# Patient Record
Sex: FEMALE | Race: WHITE | NOT HISPANIC OR LATINO | Employment: UNEMPLOYED | URBAN - NONMETROPOLITAN AREA
[De-identification: names, ages, dates, MRNs, and addresses within clinical notes are randomized per-mention and may not be internally consistent; named-entity substitution may affect disease eponyms.]

---

## 2021-01-11 ENCOUNTER — IMMUNIZATION (OUTPATIENT)
Dept: VACCINE CLINIC | Facility: HOSPITAL | Age: 81
End: 2021-01-11

## 2021-01-11 PROCEDURE — 91300 HC SARSCOV02 VAC 30MCG/0.3ML IM: CPT | Performed by: FAMILY MEDICINE

## 2021-01-11 PROCEDURE — 0001A: CPT | Performed by: FAMILY MEDICINE

## 2021-02-01 ENCOUNTER — IMMUNIZATION (OUTPATIENT)
Dept: VACCINE CLINIC | Facility: HOSPITAL | Age: 81
End: 2021-02-01

## 2021-02-01 PROCEDURE — 91300 HC SARSCOV02 VAC 30MCG/0.3ML IM: CPT | Performed by: FAMILY MEDICINE

## 2021-02-01 PROCEDURE — 0002A: CPT | Performed by: FAMILY MEDICINE

## 2023-03-09 DIAGNOSIS — M25.562 LEFT KNEE PAIN, UNSPECIFIED CHRONICITY: Primary | ICD-10-CM

## 2023-03-10 ENCOUNTER — OFFICE VISIT (OUTPATIENT)
Dept: ORTHOPEDIC SURGERY | Facility: CLINIC | Age: 83
End: 2023-03-10
Payer: MEDICARE

## 2023-03-10 ENCOUNTER — HOSPITAL ENCOUNTER (OUTPATIENT)
Dept: GENERAL RADIOLOGY | Facility: HOSPITAL | Age: 83
Discharge: HOME | End: 2023-03-10
Admitting: FAMILY MEDICINE
Payer: MEDICARE

## 2023-03-10 VITALS
HEART RATE: 71 BPM | HEIGHT: 68 IN | RESPIRATION RATE: 18 BRPM | SYSTOLIC BLOOD PRESSURE: 153 MMHG | TEMPERATURE: 97.3 F | WEIGHT: 180 LBS | DIASTOLIC BLOOD PRESSURE: 71 MMHG | OXYGEN SATURATION: 99 % | BODY MASS INDEX: 27.28 KG/M2

## 2023-03-10 DIAGNOSIS — K21.9 GASTROESOPHAGEAL REFLUX DISEASE, UNSPECIFIED WHETHER ESOPHAGITIS PRESENT: ICD-10-CM

## 2023-03-10 DIAGNOSIS — M25.562 CHRONIC PAIN OF LEFT KNEE: Primary | ICD-10-CM

## 2023-03-10 DIAGNOSIS — M25.562 LEFT KNEE PAIN, UNSPECIFIED CHRONICITY: ICD-10-CM

## 2023-03-10 DIAGNOSIS — G89.29 CHRONIC PAIN OF LEFT KNEE: Primary | ICD-10-CM

## 2023-03-10 DIAGNOSIS — M17.12 PRIMARY OSTEOARTHRITIS OF LEFT KNEE: ICD-10-CM

## 2023-03-10 PROCEDURE — 1160F RVW MEDS BY RX/DR IN RCRD: CPT | Performed by: FAMILY MEDICINE

## 2023-03-10 PROCEDURE — 99204 OFFICE O/P NEW MOD 45 MIN: CPT | Performed by: FAMILY MEDICINE

## 2023-03-10 PROCEDURE — 73562 X-RAY EXAM OF KNEE 3: CPT

## 2023-03-10 PROCEDURE — 73562 X-RAY EXAM OF KNEE 3: CPT | Performed by: RADIOLOGY

## 2023-03-10 PROCEDURE — 1159F MED LIST DOCD IN RCRD: CPT | Performed by: FAMILY MEDICINE

## 2023-03-10 RX ORDER — NITROGLYCERIN 0.4 MG/1
0.4 TABLET SUBLINGUAL AS NEEDED
COMMUNITY

## 2023-03-10 RX ORDER — PANTOPRAZOLE SODIUM 40 MG/1
40 TABLET, DELAYED RELEASE ORAL DAILY
COMMUNITY
Start: 2023-01-05

## 2023-03-10 RX ORDER — MECLIZINE HCL 25MG 25 MG/1
25 TABLET, CHEWABLE ORAL AS NEEDED
COMMUNITY

## 2023-03-10 RX ORDER — ASPIRIN 81 MG/1
81 TABLET ORAL AS NEEDED
COMMUNITY

## 2023-03-10 RX ORDER — LEVOTHYROXINE SODIUM 0.05 MG/1
50 TABLET ORAL
COMMUNITY
Start: 2023-01-05

## 2023-03-10 RX ORDER — LUBIPROSTONE 24 UG/1
24 CAPSULE ORAL
COMMUNITY
Start: 2023-01-05

## 2023-03-10 NOTE — PROGRESS NOTES
New Patient Visit      Patient: Tiffani Jean Baptiste  YOB: 1940  Date of Encounter: 03/10/2023  PCP: Hamida Veras MD  Referring Provider: Self Referring     Subjective   Tiffani Jean Baptiste is a 82 y.o. female who presents to the office today for evaluation of Initial Evaluation and Pain of the Left Knee      Chief Complaint   Patient presents with   • Left Knee - Initial Evaluation, Pain       HPI   New patient presents complaining of left knee pain. She has been having intermittent left knee flare-ups for about 1 year. They have been getting worse for the last 3 months. She states that at baseline she does not have much pain, but will intermittently have a severe flare up making it difficult to walk for several days until she can get it calmed down using ibuprofen, ice, and rest. She denies any injury. The patient does have a history of arthritis in the contralateral knee, and she has had a partial knee replacement on the right side. She states that the pain is currently 3/10, but at its worst it will be 10/10. She is administering large amounts of ibuprofen 600 to 1000 mg twice daily during flare ups, which does not provide great relief.    There is no problem list on file for this patient.      Past Medical History:   Diagnosis Date   • Osteoarthritis        Past Surgical History:   Procedure Laterality Date   • HYSTERECTOMY     • ILEOSTOMY     • KNEE ARTHROPLASTY, PARTIAL REPLACEMENT Right    • RECTOCELE REPAIR     • TONSILLECTOMY         History reviewed. No pertinent family history.    Social History     Socioeconomic History   • Marital status: Single   Tobacco Use   • Smoking status: Never   • Smokeless tobacco: Never   Vaping Use   • Vaping Use: Never used   Substance and Sexual Activity   • Alcohol use: Yes   • Drug use: Yes   • Sexual activity: Defer       Current Outpatient Medications   Medication Sig Dispense Refill   • aspirin 81 MG EC tablet Take 1 tablet by mouth As Needed.     •  "Cholecalciferol 25 MCG (1000 UT) capsule Take 1 capsule by mouth Daily.     • levothyroxine (SYNTHROID, LEVOTHROID) 50 MCG tablet Take 1 tablet by mouth.     • lubiprostone (AMITIZA) 24 MCG capsule Take 1 capsule by mouth.     • meclizine 25 MG chewable tablet chewable tablet Chew 1 tablet As Needed.     • nitroglycerin (NITROSTAT) 0.4 MG SL tablet Place 1 tablet under the tongue As Needed.     • pantoprazole (PROTONIX) 40 MG EC tablet Take 1 tablet by mouth Daily.     • diclofenac (VOLTAREN) 50 MG EC tablet Take 1 tablet by mouth 2 (Two) Times a Day As Needed (knee pain, take with food). 60 tablet 1     No current facility-administered medications for this visit.       Allergies   Allergen Reactions   • Atorvastatin Headache       Review of Systems   Constitutional: Positive for activity change. Negative for fever.   Respiratory: Negative for shortness of breath and wheezing.    Cardiovascular: Negative for chest pain.   Musculoskeletal: Positive for arthralgias, gait problem, joint swelling and myalgias.   Skin: Negative for color change and wound.   Neurological: Negative for weakness and numbness.       Visit Vitals  /71 (BP Location: Right arm, Patient Position: Sitting, Cuff Size: Adult)   Pulse 71   Temp 97.3 °F (36.3 °C) (Temporal)   Resp 18   Ht 172.7 cm (68\")   Wt 81.6 kg (180 lb)   SpO2 99%   BMI 27.37 kg/m²     82 y.o.female  Physical Exam  Vitals and nursing note reviewed.   Constitutional:       General: She is not in acute distress.     Appearance: Normal appearance.   Pulmonary:      Effort: Pulmonary effort is normal. No respiratory distress.   Skin:     General: Skin is warm and dry.      Findings: No erythema.   Neurological:      General: No focal deficit present.      Mental Status: She is alert.      Sensory: No sensory deficit.      Motor: No weakness.       Left knee:  Not particularly flared up today.  Mild medial joint line tenderness.  Negative varus/valgus stress, AP drawer, " Lachman, and Logan.  No pain on resisted testing and no effusion at this time.  Neurovascularly intact.  Intact deep tendon reflexes and pedal pulses.    Radiology Results:    XR Knee 3 View Left    Result Date: 3/10/2023    Medial and patellofemoral compartment narrowing in the knee consistent with osteoarthritis.  This report was finalized on 3/10/2023 9:59 AM by Dr. Kevin Bass MD.        Assessment & Plan   Diagnoses and all orders for this visit:    1. Chronic pain of left knee (Primary)  -     diclofenac (VOLTAREN) 50 MG EC tablet; Take 1 tablet by mouth 2 (Two) Times a Day As Needed (knee pain, take with food).  Dispense: 60 tablet; Refill: 1    2. Primary osteoarthritis of left knee  -     diclofenac (VOLTAREN) 50 MG EC tablet; Take 1 tablet by mouth 2 (Two) Times a Day As Needed (knee pain, take with food).  Dispense: 60 tablet; Refill: 1    3. Gastroesophageal reflux disease, unspecified whether esophagitis present  -     diclofenac (VOLTAREN) 50 MG EC tablet; Take 1 tablet by mouth 2 (Two) Times a Day As Needed (knee pain, take with food).  Dispense: 60 tablet; Refill: 1         MEDS ORDERED DURING VISIT:  New Medications Ordered This Visit   Medications   • diclofenac (VOLTAREN) 50 MG EC tablet     Sig: Take 1 tablet by mouth 2 (Two) Times a Day As Needed (knee pain, take with food).     Dispense:  60 tablet     Refill:  1     MEDICATION ISSUES:  Discussed medication options and treatment plan of prescribed medication as well as the risks, benefits, and side effects including potential falls, possible impaired driving and metabolic adversities among others. Patient is agreeable to call the office with any worsening of symptoms or onset of side effects. Patient is agreeable to call 911 or go to the nearest ER should he/she begin having SI/HI.     Discussion:  I reviewed x-rays with the patient showing degenerative changes in the left knee, but the patient is only having intermittent flare-ups and  would like to pursue a course of treatment to try to prevent them. I have given the patient home exercises to work on for strength and range of motion. The patient was provided with a hinged knee brace to wear during flare-ups and as needed. Changing patient's NSAID to diclofenac enteric-coated as she is experiencing GI upset from the ibuprofen. As the patient is not flared up right now, I do not recommend a steroid injection to the left knee, but I believe that the patient would benefit from viscosupplementation to try to prevent these severe knee flare-ups. We will precertify her for this and follow up for viscosupplementation injection. If the patient has a severe flare-up, I would consider physical therapy, advanced imaging and surgical referral.             This document has been electronically signed by Simón Nava DO   March 14, 2023 23:49 EDT     Transcribed from ambient dictation for Simón Nava DO by Marily Hilton.  03/15/23   10:13 EDT    I have personally performed the services described in this document as transcribed by the above individual, and it is both accurate and complete.      Part of this note may be an electronic transcription/translation of spoken language to printed text using the Dragon Dictation System.

## 2023-04-12 ENCOUNTER — OFFICE VISIT (OUTPATIENT)
Dept: ORTHOPEDIC SURGERY | Facility: CLINIC | Age: 83
End: 2023-04-12
Payer: MEDICARE

## 2023-04-12 VITALS
WEIGHT: 180 LBS | HEART RATE: 62 BPM | SYSTOLIC BLOOD PRESSURE: 122 MMHG | HEIGHT: 68 IN | OXYGEN SATURATION: 96 % | DIASTOLIC BLOOD PRESSURE: 64 MMHG | BODY MASS INDEX: 27.28 KG/M2

## 2023-04-12 DIAGNOSIS — M25.562 CHRONIC PAIN OF LEFT KNEE: Primary | ICD-10-CM

## 2023-04-12 DIAGNOSIS — M17.12 PRIMARY OSTEOARTHRITIS OF LEFT KNEE: ICD-10-CM

## 2023-04-12 DIAGNOSIS — G89.29 CHRONIC PAIN OF LEFT KNEE: Primary | ICD-10-CM

## 2023-04-12 RX ORDER — LIDOCAINE HYDROCHLORIDE 10 MG/ML
5 INJECTION, SOLUTION EPIDURAL; INFILTRATION; INTRACAUDAL; PERINEURAL
Status: COMPLETED | OUTPATIENT
Start: 2023-04-12 | End: 2023-04-12

## 2023-04-12 RX ADMIN — LIDOCAINE HYDROCHLORIDE 5 ML: 10 INJECTION, SOLUTION EPIDURAL; INFILTRATION; INTRACAUDAL; PERINEURAL at 13:56

## 2023-04-12 NOTE — PROGRESS NOTES
"Follow Up Visit      Patient: Tiffani Jean Baptiste  YOB: 1940  Date of Encounter: 04/12/2023  PCP: Hamida Veras MD  Referring Provider: No ref. provider found     Subjective   Tiffani Jean Baptiste is a 82 y.o. female who presents to the office today for procedure    Chief Complaint   Patient presents with   • Left Knee - Follow-up, Pain, Edema       HPI  Patient presents for follow-up for left knee pain and to get Monovisc injection in the left knee.  Denies any acute illness fevers or antibiotic use  Allergies   Allergen Reactions   • Atorvastatin Headache       Visit Vitals  /64 (BP Location: Left arm, Patient Position: Sitting, Cuff Size: Adult)   Pulse 62   Ht 172.7 cm (67.99\")   Wt 81.6 kg (180 lb)   SpO2 96%   BMI 27.38 kg/m²     82 y.o.female    Large Joint Arthrocentesis: L knee  Date/Time: 4/12/2023 1:56 PM  Consent given by: patient  Site marked: site marked  Timeout: Immediately prior to procedure a time out was called to verify the correct patient, procedure, equipment, support staff and site/side marked as required   Supporting Documentation  Indications: pain and joint swelling   Procedure Details  Location: knee - L knee  Needle size: 20 G  Approach: anterolateral  Medications administered: 88 mg Hyaluronan 88 MG/4ML; 5 mL lidocaine PF 1% 1 %  Patient tolerance: patient tolerated the procedure well with no immediate complications      Injection site on the lateral knee was cleaned with alcohol and iodine and anesthesia was provided with ethyl chloride spray.  Then 2 cc of 1% lidocaine without epinephrine was injected into the subcutaneous tissues using a 1.5 inch 25-gauge needle for anesthesia.  Then using sterile technique and a 20-gauge 1.5 inch needle the knee joint was accessed and further 3 cc of 1% lidocaine was injected to confirm access to the joint.  Using sterile technique the syringe was switched and 88 mg of Monovisc was injected.  The injection site was covered with " sterile bandage.  There is no adverse effects and negligible  blood loss.  Follow-up care and precautions were discussed.      Assessment & Plan   Diagnoses and all orders for this visit:    1. Chronic pain of left knee (Primary)    2. Primary osteoarthritis of left knee    Other orders  -     Large Joint Arthrocentesis         MEDS ORDERED DURING VISIT:  No orders of the defined types were placed in this encounter.    MEDICATION ISSUES:  Discussed medication options and treatment plan of prescribed medication as well as the risks, benefits, and side effects including potential falls, possible impaired driving and metabolic adversities among others. Patient is agreeable to call the office with any worsening of symptoms or onset of side effects. Patient is agreeable to call 911 or go to the nearest ER should he/she begin having SI/HI.     Discussion:    Patient received Monovisc injection the left knee today with no adverse effects.  Follow-up in 1 to 2 weeks.           This document has been electronically signed by Simón Nava DO   April 12, 2023 13:57 EDT    Part of this note may be an electronic transcription/translation of spoken language to printed text using the Dragon Dictation System.

## 2023-04-24 ENCOUNTER — OFFICE VISIT (OUTPATIENT)
Dept: ORTHOPEDIC SURGERY | Facility: CLINIC | Age: 83
End: 2023-04-24
Payer: MEDICARE

## 2023-04-24 ENCOUNTER — HOSPITAL ENCOUNTER (OUTPATIENT)
Dept: GENERAL RADIOLOGY | Facility: HOSPITAL | Age: 83
Discharge: HOME | End: 2023-04-24
Admitting: FAMILY MEDICINE
Payer: MEDICARE

## 2023-04-24 VITALS
HEIGHT: 68 IN | HEART RATE: 61 BPM | WEIGHT: 180 LBS | DIASTOLIC BLOOD PRESSURE: 75 MMHG | OXYGEN SATURATION: 98 % | SYSTOLIC BLOOD PRESSURE: 163 MMHG | BODY MASS INDEX: 27.28 KG/M2

## 2023-04-24 DIAGNOSIS — M25.512 CHRONIC PAIN OF BOTH SHOULDERS: ICD-10-CM

## 2023-04-24 DIAGNOSIS — G89.29 CHRONIC PAIN OF LEFT KNEE: Primary | ICD-10-CM

## 2023-04-24 DIAGNOSIS — K21.9 GASTROESOPHAGEAL REFLUX DISEASE, UNSPECIFIED WHETHER ESOPHAGITIS PRESENT: ICD-10-CM

## 2023-04-24 DIAGNOSIS — M25.562 CHRONIC PAIN OF LEFT KNEE: Primary | ICD-10-CM

## 2023-04-24 DIAGNOSIS — M25.611 SHOULDER JOINT STIFFNESS, BILATERAL: ICD-10-CM

## 2023-04-24 DIAGNOSIS — M17.12 PRIMARY OSTEOARTHRITIS OF LEFT KNEE: ICD-10-CM

## 2023-04-24 DIAGNOSIS — G89.29 CHRONIC PAIN OF BOTH SHOULDERS: ICD-10-CM

## 2023-04-24 DIAGNOSIS — M25.612 SHOULDER JOINT STIFFNESS, BILATERAL: ICD-10-CM

## 2023-04-24 DIAGNOSIS — M25.511 CHRONIC PAIN OF BOTH SHOULDERS: ICD-10-CM

## 2023-04-24 PROCEDURE — 73030 X-RAY EXAM OF SHOULDER: CPT

## 2023-04-24 PROCEDURE — 99214 OFFICE O/P EST MOD 30 MIN: CPT | Performed by: FAMILY MEDICINE

## 2023-04-24 PROCEDURE — 73030 X-RAY EXAM OF SHOULDER: CPT | Performed by: RADIOLOGY

## 2023-04-25 NOTE — PROGRESS NOTES
"Follow Up Visit      Patient: Tiffani Jean Baptiste  YOB: 1940  Date of Encounter: 04/24/2023  PCP: Hamida Veras MD  Referring Provider: No ref. provider found     Subjective   Tiffani Jean Bapitste is a 82 y.o. female who presents to the office today for evaluation of Follow-up of the Left Knee      Chief Complaint   Patient presents with   • Left Knee - Follow-up       HPI     The patient presents for a follow-up after having a left knee injection at the last visit. She states that the left knee is doing very well with no pain at her baseline and is able to perform normal activities without any pain and is only experiencing occasional \"twinges\". She states that the medications are helping. She also states that she has been having pain and soreness in her bilateral shoulders for a significant amount of time without injury. She has been applying Voltaren gel on her left knee which is helpful and is promoting improvement.    There is no problem list on file for this patient.      Past Medical History:   Diagnosis Date   • Osteoarthritis        Allergies   Allergen Reactions   • Atorvastatin Headache       Review of Systems   Constitutional: Positive for activity change. Negative for fever.   Respiratory: Negative for shortness of breath and wheezing.    Cardiovascular: Negative for chest pain.   Musculoskeletal: Positive for arthralgias, gait problem, joint swelling and myalgias.        Bilateral shoulder pain.    Skin: Negative for color change and wound.   Neurological: Negative for weakness and numbness.       Visit Vitals  /75 (BP Location: Right arm, Patient Position: Sitting, Cuff Size: Adult)   Pulse 61   Ht 172.7 cm (67.99\")   Wt 81.6 kg (180 lb)   SpO2 98%   BMI 27.38 kg/m²     82 y.o.female  Physical Exam  Vitals and nursing note reviewed.   Constitutional:       General: She is not in acute distress.     Appearance: Normal appearance.   Pulmonary:      Effort: Pulmonary effort is normal. No " respiratory distress.   Musculoskeletal:      Right shoulder: No tenderness. Decreased range of motion.      Left shoulder: No tenderness. Decreased range of motion.      Left knee: No effusion. Normal range of motion. No tenderness.      Instability Tests: Anterior drawer test negative. Posterior drawer test negative. Anterior Lachman test negative. Medial Logan test negative.      Comments: Left knee: No pain on resisted testing.  Negative varus and valgus test    Bilateral shoulders: Non-tender, mildly restricted flexion and abduction bilaterally with pain at end range. Positive impingement signs. No pain on biceps, triceps, rotator cuff testing, or weakness. Neurovascularly intact.   Skin:     General: Skin is warm and dry.      Findings: No erythema.   Neurological:      General: No focal deficit present.      Mental Status: She is alert.      Sensory: No sensory deficit.      Motor: No weakness.         Radiology Results:    XR Shoulder 2+ View Bilateral    Result Date: 4/24/2023  Arthritic change but no acute bony abnormality  This report was finalized on 4/24/2023 9:35 AM by Dr. Mark Dahl MD.        Assessment & Plan   Diagnoses and all orders for this visit:    1. Chronic pain of left knee (Primary)  -     Diclofenac Sodium (VOLTAREN) 1 % gel gel; Apply 4 g topically to the appropriate area as directed 4 (Four) Times a Day As Needed (shoulder and knee pain). 2g to each shoulder  Dispense: 150 g; Refill: 2    2. Primary osteoarthritis of left knee  -     Diclofenac Sodium (VOLTAREN) 1 % gel gel; Apply 4 g topically to the appropriate area as directed 4 (Four) Times a Day As Needed (shoulder and knee pain). 2g to each shoulder  Dispense: 150 g; Refill: 2    3. Gastroesophageal reflux disease, unspecified whether esophagitis present  -     Diclofenac Sodium (VOLTAREN) 1 % gel gel; Apply 4 g topically to the appropriate area as directed 4 (Four) Times a Day As Needed (shoulder and knee pain). 2g to each  shoulder  Dispense: 150 g; Refill: 2    4. Chronic pain of both shoulders  -     Diclofenac Sodium (VOLTAREN) 1 % gel gel; Apply 4 g topically to the appropriate area as directed 4 (Four) Times a Day As Needed (shoulder and knee pain). 2g to each shoulder  Dispense: 150 g; Refill: 2  -     XR Shoulder 2+ View Bilateral    5. Shoulder joint stiffness, bilateral  -     XR Shoulder 2+ View Bilateral         MEDS ORDERED DURING VISIT:  New Medications Ordered This Visit   Medications   • Diclofenac Sodium (VOLTAREN) 1 % gel gel     Sig: Apply 4 g topically to the appropriate area as directed 4 (Four) Times a Day As Needed (shoulder and knee pain). 2g to each shoulder     Dispense:  150 g     Refill:  2     MEDICATION ISSUES:  Discussed medication options and treatment plan of prescribed medication as well as the risks, benefits, and side effects including potential falls, possible impaired driving and metabolic adversities among others. Patient is agreeable to call the office with any worsening of symptoms or onset of side effects. Patient is agreeable to call 911 or go to the nearest ER should he/she begin having SI/HI.     Discussion:  I recommend x-rays of the bilateral shoulders. We will give the patient home exercises to perform Voltaren gel was ordered for her to also apply to the bilateral shoulders. We will follow up in 2 to 4 weeks and we will review the x-rays at that time. She will have her x-ray performed before the next visit. We will consider further interventions including physical therapy, injections, and advanced imaging if she is not improving.         This document has been electronically signed by Simón Nava DO   April 24, 2023 23:44 EDT    Dictated Utilizing Dragon Dictation: Part of this note may be an electronic transcription/translation of spoken language to printed text using the Dragon Dictation System.    Transcribed from ambient dictation for Simón Nava DO by Vika  Jaja.  04/25/23   10:14 EDT    I have personally performed the services described in this document as transcribed by the above individual, and it is both accurate and complete.

## 2023-05-31 ENCOUNTER — OFFICE VISIT (OUTPATIENT)
Dept: ORTHOPEDIC SURGERY | Facility: CLINIC | Age: 83
End: 2023-05-31
Payer: MEDICARE

## 2023-05-31 ENCOUNTER — TELEPHONE (OUTPATIENT)
Dept: SURGERY | Facility: CLINIC | Age: 83
End: 2023-05-31

## 2023-05-31 VITALS
SYSTOLIC BLOOD PRESSURE: 133 MMHG | WEIGHT: 173.6 LBS | TEMPERATURE: 98 F | BODY MASS INDEX: 26.31 KG/M2 | HEIGHT: 68 IN | HEART RATE: 67 BPM | OXYGEN SATURATION: 97 % | DIASTOLIC BLOOD PRESSURE: 63 MMHG

## 2023-05-31 DIAGNOSIS — G89.29 CHRONIC PAIN OF BOTH SHOULDERS: Primary | ICD-10-CM

## 2023-05-31 DIAGNOSIS — M25.512 CHRONIC PAIN OF BOTH SHOULDERS: Primary | ICD-10-CM

## 2023-05-31 DIAGNOSIS — M25.611 SHOULDER JOINT STIFFNESS, BILATERAL: ICD-10-CM

## 2023-05-31 DIAGNOSIS — M25.612 SHOULDER JOINT STIFFNESS, BILATERAL: ICD-10-CM

## 2023-05-31 DIAGNOSIS — M19.011 LOCALIZED OSTEOARTHRITIS OF BOTH SHOULDER REGIONS: ICD-10-CM

## 2023-05-31 DIAGNOSIS — M19.012 LOCALIZED OSTEOARTHRITIS OF BOTH SHOULDER REGIONS: ICD-10-CM

## 2023-05-31 DIAGNOSIS — M25.511 CHRONIC PAIN OF BOTH SHOULDERS: Primary | ICD-10-CM

## 2023-05-31 PROCEDURE — 99213 OFFICE O/P EST LOW 20 MIN: CPT | Performed by: FAMILY MEDICINE

## 2023-05-31 NOTE — PROGRESS NOTES
"Follow Up Visit      Patient: Tiffani Jean Baptiste  YOB: 1940  Date of Encounter: 05/31/2023  PCP: Hamida Veras MD  Referring Provider: No ref. provider found     Subjective   Tiffani Jean Baptiste is a 83 y.o. female who presents to the office today for evaluation of Follow-up of the Left Knee      Chief Complaint   Patient presents with    Left Knee - Follow-up       HPI     The patient presents for follow-up of bilateral shoulder pain. At the last visit, patient was given exercises and topical Voltaren to put on the shoulders. Reports that she is doing great. States that she is only needing to use the oral NSAID now a few times a week and that her shoulders generally do not bother her.    Patient Active Problem List   Diagnosis    Localized osteoarthritis of both shoulder regions       Past Medical History:   Diagnosis Date    Osteoarthritis        Allergies   Allergen Reactions    Atorvastatin Headache       Review of Systems   Constitutional: Positive for activity change. Negative for fever.   Respiratory: Negative for shortness of breath and wheezing.    Cardiovascular: Negative for chest pain.   Musculoskeletal: Positive for arthralgias and myalgias.   Skin: Negative for color change and wound.   Neurological: Negative for weakness and numbness.       Visit Vitals  /63 (BP Location: Left arm, Patient Position: Sitting)   Pulse 67   Temp 98 °F (36.7 °C)   Ht 172.7 cm (68\")   Wt 78.7 kg (173 lb 9.6 oz)   SpO2 97%   BMI 26.40 kg/m²     83 y.o.female  Physical Exam  Vitals and nursing note reviewed.   Constitutional:       General: She is not in acute distress.     Appearance: Normal appearance.   Pulmonary:      Effort: Pulmonary effort is normal. No respiratory distress.   Skin:     General: Skin is warm and dry.      Findings: No erythema.   Neurological:      General: No focal deficit present.      Mental Status: She is alert.      Sensory: No sensory deficit.      Motor: No weakness. "     Ortho Exam:    Bilateral shoulders:  Nontender, mildly restricted flexion and abduction bilaterally with no pain at end range.  Negative impingement signs.  Painless testing of the rotator cuff, biceps, triceps, and no weakness.    Radiology Results:    No radiology results for the last 30 days.    Assessment & Plan   Diagnoses and all orders for this visit:    1. Chronic pain of both shoulders (Primary)    2. Shoulder joint stiffness, bilateral    3. Localized osteoarthritis of both shoulder regions         MEDS ORDERED DURING VISIT:  No orders of the defined types were placed in this encounter.    MEDICATION ISSUES:  Discussed medication options and treatment plan of prescribed medication as well as the risks, benefits, and side effects including potential falls, possible impaired driving and metabolic adversities among others. Patient is agreeable to call the office with any worsening of symptoms or onset of side effects. Patient is agreeable to call 911 or go to the nearest ER should he/she begin having SI/HI.     Discussion:  Reviewed x-rays with patient showing degenerative changes of the shoulder. Some stiffness of the joints persists, but overall pain is significantly improved. Patient may continue using Voltaren gel as needed and doing her exercises and will follow up with me as needed if the pain in her shoulders reoccurs.             This document has been electronically signed by Simón Nava DO     May 31, 2023 18:42 EDT    Dictated Utilizing Dragon Dictation: Part of this note may be an electronic transcription/translation of spoken language to printed text using the Dragon Dictation System.    Transcribed from ambient dictation for Simón Nava DO by Thuy Treviño.  05/31/23   18:44 EDT    I have personally performed the services described in this document as transcribed by the above individual, and it is both accurate and complete.

## 2023-07-27 ENCOUNTER — OFFICE VISIT (OUTPATIENT)
Dept: ORTHOPEDIC SURGERY | Facility: CLINIC | Age: 83
End: 2023-07-27
Payer: MEDICARE

## 2023-07-27 VITALS
WEIGHT: 173 LBS | HEART RATE: 62 BPM | BODY MASS INDEX: 26.22 KG/M2 | SYSTOLIC BLOOD PRESSURE: 130 MMHG | HEIGHT: 68 IN | OXYGEN SATURATION: 96 % | DIASTOLIC BLOOD PRESSURE: 74 MMHG

## 2023-07-27 DIAGNOSIS — M77.11 RIGHT LATERAL EPICONDYLITIS: ICD-10-CM

## 2023-07-27 DIAGNOSIS — M25.511 CHRONIC PAIN OF BOTH SHOULDERS: Primary | ICD-10-CM

## 2023-07-27 DIAGNOSIS — M19.012 LOCALIZED OSTEOARTHRITIS OF BOTH SHOULDER REGIONS: ICD-10-CM

## 2023-07-27 DIAGNOSIS — M25.512 CHRONIC PAIN OF BOTH SHOULDERS: Primary | ICD-10-CM

## 2023-07-27 DIAGNOSIS — M25.612 SHOULDER JOINT STIFFNESS, BILATERAL: ICD-10-CM

## 2023-07-27 DIAGNOSIS — G89.29 CHRONIC PAIN OF BOTH SHOULDERS: Primary | ICD-10-CM

## 2023-07-27 DIAGNOSIS — M19.011 LOCALIZED OSTEOARTHRITIS OF BOTH SHOULDER REGIONS: ICD-10-CM

## 2023-07-27 DIAGNOSIS — M25.611 SHOULDER JOINT STIFFNESS, BILATERAL: ICD-10-CM

## 2023-07-27 DIAGNOSIS — M75.21 BICEPS TENDONITIS ON RIGHT: ICD-10-CM

## 2023-07-27 PROCEDURE — 99213 OFFICE O/P EST LOW 20 MIN: CPT | Performed by: FAMILY MEDICINE

## 2024-04-01 ENCOUNTER — TELEPHONE (OUTPATIENT)
Dept: ORTHOPEDIC SURGERY | Facility: CLINIC | Age: 84
End: 2024-04-01
Payer: MEDICARE

## 2024-04-01 NOTE — TELEPHONE ENCOUNTER
Caller: PATIENT    Relationship to patient: SELF     Best call back number: [phone]    Chief complaint: LEFT KNEE PAIN    Type of visit: GEL INJECTION    Requested date: NEXT AVAILABLE APPT    Additional notes: PATIENT WAS CALLING TO SCHEDULE AN APPT WITH DR. MACIAS FOR A GEL INJECTION IN THE LEFT KNEE. THANK YOU!

## 2024-04-09 ENCOUNTER — HOSPITAL ENCOUNTER (OUTPATIENT)
Dept: GENERAL RADIOLOGY | Facility: HOSPITAL | Age: 84
Discharge: HOME | End: 2024-04-09
Admitting: FAMILY MEDICINE
Payer: MEDICARE

## 2024-04-09 ENCOUNTER — OFFICE VISIT (OUTPATIENT)
Dept: ORTHOPEDIC SURGERY | Facility: CLINIC | Age: 84
End: 2024-04-09
Payer: MEDICARE

## 2024-04-09 VITALS
HEIGHT: 68 IN | HEART RATE: 53 BPM | WEIGHT: 174 LBS | SYSTOLIC BLOOD PRESSURE: 161 MMHG | BODY MASS INDEX: 26.37 KG/M2 | DIASTOLIC BLOOD PRESSURE: 77 MMHG | OXYGEN SATURATION: 98 %

## 2024-04-09 DIAGNOSIS — M25.562 BILATERAL CHRONIC KNEE PAIN: Primary | ICD-10-CM

## 2024-04-09 DIAGNOSIS — M25.561 BILATERAL CHRONIC KNEE PAIN: Primary | ICD-10-CM

## 2024-04-09 DIAGNOSIS — G89.29 BILATERAL CHRONIC KNEE PAIN: Primary | ICD-10-CM

## 2024-04-09 DIAGNOSIS — Z79.01 CHRONIC ANTICOAGULATION: ICD-10-CM

## 2024-04-09 DIAGNOSIS — M17.12 PRIMARY OSTEOARTHRITIS OF LEFT KNEE: ICD-10-CM

## 2024-04-09 DIAGNOSIS — Z96.651 STATUS POST RIGHT PARTIAL KNEE REPLACEMENT: ICD-10-CM

## 2024-04-09 PROCEDURE — 73562 X-RAY EXAM OF KNEE 3: CPT

## 2024-04-09 PROCEDURE — 99214 OFFICE O/P EST MOD 30 MIN: CPT | Performed by: FAMILY MEDICINE

## 2024-04-09 RX ORDER — SOTALOL HYDROCHLORIDE 80 MG/1
40 TABLET ORAL
COMMUNITY
Start: 2024-03-25

## 2024-04-09 NOTE — PROGRESS NOTES
"Follow Up Visit      Patient: Tiffani Jean Baptiste  YOB: 1940  Date of Encounter: 04/09/2024  PCP: Hamida Veras MD  Referring Provider: No ref. provider found     Subjective   Tiffani Jean Baptiste is a 83 y.o. female who presents to the office today for evaluation of Follow-up and Pain of the Right Shoulder and Follow-up and Pain of the Left Shoulder      Chief Complaint   Patient presents with    Right Shoulder - Follow-up, Pain    Left Shoulder - Follow-up, Pain       HPI  Patient presents for follow-up for left knee pain which she notes has been significantly improved lately.  States that the steroid 2 months ago helped but feels like the gel shot she had before has worn off.  Patient also notes right knee pain under the kneecap with a history of partial joint replacement 4 years ago by Dr. Sullivan at [facility].  States that her shoulders are still doing well.  Patient continues to take Voltaren 50 mg twice daily as well as use of topical Voltaren gel but has been started on chronic anticoagulation by her cardiologist.  States that they did not tell her to stop the NSAID  Patient Active Problem List   Diagnosis    Localized osteoarthritis of both shoulder regions    Chronic anticoagulation    Status post right partial knee replacement    Primary osteoarthritis of left knee    Bilateral chronic knee pain       Past Medical History:   Diagnosis Date    Osteoarthritis        Allergies   Allergen Reactions    Atorvastatin Headache       Vitals:   /77 (BP Location: Right arm, Patient Position: Sitting, Cuff Size: Adult)   Pulse 53   Ht 172.7 cm (67.99\")   Wt 78.9 kg (174 lb)   SpO2 98%   BMI 26.46 kg/m²       Review of Systems   Constitutional:  Positive for activity change. Negative for fever.   Respiratory:  Negative for shortness of breath and wheezing.    Cardiovascular:  Negative for chest pain.   Musculoskeletal:  Positive for arthralgias, gait problem and myalgias.   Skin: " " Negative for color change and wound.   Neurological:  Negative for weakness and numbness.       Visit Vitals  /77 (BP Location: Right arm, Patient Position: Sitting, Cuff Size: Adult)   Pulse 53   Ht 172.7 cm (67.99\")   Wt 78.9 kg (174 lb)   SpO2 98%   BMI 26.46 kg/m²     83 y.o.female  Physical Exam  Vitals and nursing note reviewed.   Constitutional:       General: She is not in acute distress.     Appearance: Normal appearance.   Pulmonary:      Effort: Pulmonary effort is normal. No respiratory distress.   Musculoskeletal:      Right knee: Effusion (1+) present. Decreased range of motion. Tenderness present over the lateral joint line. No medial joint line tenderness. No LCL laxity, MCL laxity, ACL laxity or PCL laxity. Normal pulse.      Instability Tests: Anterior drawer test negative. Posterior drawer test negative. Anterior Lachman test negative. Medial Logan test negative and lateral Logan test negative.      Left knee: No effusion. Normal range of motion. Tenderness present over the medial joint line. No lateral joint line tenderness. No LCL laxity, MCL laxity, ACL laxity or PCL laxity.Normal pulse.      Instability Tests: Anterior drawer test negative. Posterior drawer test negative. Anterior Lachman test negative. Medial Logan test negative and lateral Logan test negative.      Comments: 1+ DTR and intact pedal pulses of both legs.    Skin:     General: Skin is warm and dry.      Findings: No erythema.   Neurological:      General: No focal deficit present.      Mental Status: She is alert.      Sensory: No sensory deficit.      Motor: No weakness.         Radiology Results:    XR Knee 3 View Bilateral    Result Date: 4/9/2024  1.  Right knee effusion. 2.  Tricompartmental osteoarthritis both knees. 3.  Changes of previous right medial knee compartment arthroplasty.   This report was finalized on 4/9/2024 12:27 PM by Dr. Crow Hernandez MD.       Assessment & Plan   Diagnoses and all " orders for this visit:    1. Bilateral chronic knee pain (Primary)  -     XR Knee 3 View Bilateral    2. Primary osteoarthritis of left knee  -     XR Knee 3 View Bilateral    3. Status post right partial knee replacement  -     XR Knee 3 View Bilateral    4. Chronic anticoagulation  -     XR Knee 3 View Bilateral         MEDS ORDERED DURING VISIT:  No orders of the defined types were placed in this encounter.    MEDICATION ISSUES:  Discussed medication options and treatment plan of prescribed medication as well as the risks, benefits, and side effects including potential falls, possible impaired driving and metabolic adversities among others. Patient is agreeable to call the office with any worsening of symptoms or onset of side effects. Patient is agreeable to call 911 or go to the nearest ER should he/she begin having SI/HI.     Discussion:  1.  Progression of patient's knee arthritis.  Flareup of arthritic pain.  Patient is due for a new viscosupplementation injection which will be precertified for the left knee.  Will check with cardiology to see if the patient can continue her oral and topical NSAIDs.  We discussed that these are usually discouraged when on chronic anticoagulation    Get new bilateral knee xray    Patient needs to follow-up with her surgeon for pain in the right knee after unicompartmental joint replacement.    Spent greater than 30 minutes with the patient in interview exam discussion and documentation.         This document has been electronically signed by Simón Nava DO   April 15, 2024 11:26 EDT    Dictated Utilizing Dragon Dictation: Part of this note may be an electronic transcription/translation of spoken language to printed text using the Dragon Dictation System.

## 2024-04-15 ENCOUNTER — TELEPHONE (OUTPATIENT)
Dept: ORTHOPEDIC SURGERY | Facility: CLINIC | Age: 84
End: 2024-04-15
Payer: MEDICARE

## 2024-04-15 NOTE — TELEPHONE ENCOUNTER
Called and left a message for the medical assistant to make sure they are aware the patient is taking oral and using topical n saids and that she is on a blood thinner.

## 2024-04-17 NOTE — TELEPHONE ENCOUNTER
Office returned my call and said that it is okay if the patient takes NSAID while using her blood thinner.

## 2024-04-23 ENCOUNTER — CLINICAL SUPPORT (OUTPATIENT)
Dept: ORTHOPEDIC SURGERY | Facility: CLINIC | Age: 84
End: 2024-04-23
Payer: MEDICARE

## 2024-04-23 VITALS
OXYGEN SATURATION: 96 % | BODY MASS INDEX: 26.37 KG/M2 | HEIGHT: 68 IN | DIASTOLIC BLOOD PRESSURE: 76 MMHG | SYSTOLIC BLOOD PRESSURE: 169 MMHG | WEIGHT: 174 LBS | HEART RATE: 57 BPM

## 2024-04-23 DIAGNOSIS — G89.29 BILATERAL CHRONIC KNEE PAIN: Primary | ICD-10-CM

## 2024-04-23 DIAGNOSIS — M25.561 BILATERAL CHRONIC KNEE PAIN: Primary | ICD-10-CM

## 2024-04-23 DIAGNOSIS — M17.12 PRIMARY OSTEOARTHRITIS OF LEFT KNEE: ICD-10-CM

## 2024-04-23 DIAGNOSIS — G25.89 SCAPULAR DYSKINESIS: ICD-10-CM

## 2024-04-23 DIAGNOSIS — Z96.651 STATUS POST RIGHT PARTIAL KNEE REPLACEMENT: ICD-10-CM

## 2024-04-23 DIAGNOSIS — M25.562 BILATERAL CHRONIC KNEE PAIN: Primary | ICD-10-CM

## 2024-04-23 DIAGNOSIS — M62.838 MUSCLE SPASM: ICD-10-CM

## 2024-04-23 DIAGNOSIS — Z79.01 CHRONIC ANTICOAGULATION: ICD-10-CM

## 2024-04-23 PROCEDURE — 99214 OFFICE O/P EST MOD 30 MIN: CPT | Performed by: FAMILY MEDICINE

## 2024-04-23 PROCEDURE — 20610 DRAIN/INJ JOINT/BURSA W/O US: CPT | Performed by: FAMILY MEDICINE

## 2024-04-23 RX ADMIN — LIDOCAINE HYDROCHLORIDE 6 ML: 10 INJECTION, SOLUTION EPIDURAL; INFILTRATION; INTRACAUDAL; PERINEURAL at 11:10

## 2024-04-23 NOTE — PROGRESS NOTES
"Follow Up Visit      Patient: Tiffani Jean Baptiste  YOB: 1940  Date of Encounter: 04/23/2024  PCP: Hamida Veras MD  Referring Provider: No ref. provider found     Subjective   Tiffani Jean Baptiste is a 83 y.o. female who presents to the office today for evaluation of Follow-up and Pain of the Right Shoulder and Follow-up and Pain of the Left Shoulder      Chief Complaint   Patient presents with    Right Shoulder - Follow-up, Pain    Left Shoulder - Follow-up, Pain       HPI  The patient presents for follow-up for bilateral knee pain from arthritis.    The patient reports an improvement in her right knee following rest and the reduction in swelling, albeit with persistent pain on the lateral side. The left knee remains painful, prompting her desire for a Monovisc injection, a treatment that has previously provided relief. The patient continues her chronic anticoagulation regimen, which could potentially increase her risk for the procedure.    Additionally, the patient reports a new issue with her right shoulder, describing a \"drooping appearance.\" This issue, which she has observed in all her female family members, has not caused any associated pain. She denies any associated weakness, numbness, tingling, pain, facial weakness, headaches, vision changes, or neurological changes.    Patient Active Problem List   Diagnosis    Localized osteoarthritis of both shoulder regions    Chronic anticoagulation    Status post right partial knee replacement    Primary osteoarthritis of left knee    Bilateral chronic knee pain       Past Medical History:   Diagnosis Date    Osteoarthritis        Allergies   Allergen Reactions    Atorvastatin Headache       Vitals:   /76 (BP Location: Left arm, Patient Position: Sitting, Cuff Size: Adult)   Pulse 57   Ht 172.7 cm (67.99\")   Wt 78.9 kg (174 lb)   SpO2 96%   BMI 26.46 kg/m²           Review of Systems   Constitutional:  Positive for activity change. Negative for " "fever.   Respiratory:  Negative for shortness of breath and wheezing.    Cardiovascular:  Negative for chest pain.   Musculoskeletal:  Positive for arthralgias and myalgias. Negative for neck pain and neck stiffness.   Skin:  Negative for color change and wound.   Neurological:  Negative for weakness and numbness.       Visit Vitals  /76 (BP Location: Left arm, Patient Position: Sitting, Cuff Size: Adult)   Pulse 57   Ht 172.7 cm (67.99\")   Wt 78.9 kg (174 lb)   SpO2 96%   BMI 26.46 kg/m²     83 y.o.female  Physical Exam  Vitals and nursing note reviewed.   Constitutional:       General: She is not in acute distress.     Appearance: Normal appearance.   HENT:      Head:      Comments: No facial drooping  Pulmonary:      Effort: Pulmonary effort is normal. No respiratory distress.   Musculoskeletal:      Right shoulder: No tenderness.      Right knee: No effusion. Decreased range of motion. Tenderness present over the lateral joint line. No medial joint line tenderness.      Left knee: No effusion. Normal range of motion. Tenderness present over the medial joint line. No lateral joint line tenderness.      Comments: Neck and shoulders are nontender.  Patient appears to have increased spasm of the left trapezius muscle which is causing a \"droopy appearance\" of the right shoulder.  Neither these areas are painful and strength is intact.  Patient does have mildly dyskinetic motion of the right scapula and mild pain on neck motion.  Bilateral upper extremities are neurovascularly intact with intact strength.         1+ DTR and intact pedal pulses of both legs.    Skin:     General: Skin is warm and dry.      Findings: No erythema.   Neurological:      General: No focal deficit present.      Mental Status: She is alert.      Cranial Nerves: Cranial nerves 2-12 are intact.      Sensory: Sensation is intact. No sensory deficit.      Motor: Motor function is intact. No weakness.       - Large Joint Arthrocentesis: L " knee on 4/23/2024 11:10 AM  Indications: pain  Details: 20 G needle, anteromedial approach  Medications: 6 mL lidocaine PF 1% 1 %; 88 mg Hyaluronan 88 MG/4ML  Outcome: tolerated well, no immediate complications    Injection site on the medial knee was cleaned with alcohol and iodine and anesthesia was provided with ethyl chloride spray.  Then 3 cc of 1% lidocaine without epinephrine was injected into the subcutaneous tissues using a 1.5 inch 27-gauge needle for anesthesia.  Then using sterile technique and a 20-gauge 1.5 inch needle the knee joint was accessed and further 2 cc of 1% lidocaine was injected to confirm access to the joint.  Using sterile technique the syringe was switched and 88mg of monovisc was injected.  The injection site was covered with sterile bandage.  There is no adverse effects and negligible  blood loss.  Follow-up care and precautions were discussed.      Consent was given by the patient. Immediately prior to procedure a time out was called to verify the correct patient, procedure, equipment, support staff and site/side marked as required. Patient was prepped and draped in the usual sterile fashion.          Radiology Results:    XR Knee 3 View Bilateral    Result Date: 4/9/2024  1.  Right knee effusion. 2.  Tricompartmental osteoarthritis both knees. 3.  Changes of previous right medial knee compartment arthroplasty.   This report was finalized on 4/9/2024 12:27 PM by Dr. Crow Hernandez MD.       Assessment & Plan   Diagnoses and all orders for this visit:    1. Bilateral chronic knee pain (Primary)    2. Primary osteoarthritis of left knee    3. Status post right partial knee replacement    4. Chronic anticoagulation    5. Muscle spasm    6. Scapular dyskinesis    Other orders  -     - Large Joint Arthrocentesis         MEDS ORDERED DURING VISIT:  No orders of the defined types were placed in this encounter.    MEDICATION ISSUES:  Discussed medication options and treatment plan of  "prescribed medication as well as the risks, benefits, and side effects including potential falls, possible impaired driving and metabolic adversities among others. Patient is agreeable to call the office with any worsening of symptoms or onset of side effects. Patient is agreeable to call 911 or go to the nearest ER should he/she begin having SI/HI.     Discussion:      Bilateral knee pain secondary to arthritis.  The patient's right knee has shown improvement.   The patient is advised to persist with her current medication regimen and adhere to a home exercise regimen.   Physical therapy may be considered in the future.   Should the patient continue to experience issues with the knee, a follow-up with her orthopedic surgeon, Dr. Lopez, who previously performed her unicompartmental joint replacement, may be necessary.    Left knee pain.  The patient received a Monovisc injection in the left knee today, which she has done well with in the past without any adverse effects.   The patient is at an elevated risk for this procedure due to her chronic anticoagulation.    Right shoulder \"drooping\" appearance.  The appearance of her right shoulder \"drooping\" appears to be more related to a muscle spasm of the trapezius on the left, which is causing the right side to become more \"droopy.\"   The patient has been provided with exercises to perform for the shoulder blades, trapezius, and neck at home to attempt to alleviate the \"drooping\" appearance.   She is to continue her current medication regimen and use hot packs or a hard rubber ball to massage her shoulder areas.    Follow-up  The patient is scheduled for a follow-up visit in 1 month, or sooner if necessary.           This document has been electronically signed by Simón Nava DO   April 23, 2024 11:18 EDT    Dictated Utilizing Dragon Dictation: Part of this note may be an electronic transcription/translation of spoken language to printed text using the Dragon " Dictation System.    Transcribed from ambient dictation for Simón Nava DO by Amarilys Pizarro.  04/23/24   11:35 EDT    I have personally performed the services described in this document as transcribed by the above individual, and it is both accurate and complete.

## 2024-04-30 RX ORDER — LIDOCAINE HYDROCHLORIDE 10 MG/ML
6 INJECTION, SOLUTION EPIDURAL; INFILTRATION; INTRACAUDAL; PERINEURAL
Status: COMPLETED | OUTPATIENT
Start: 2024-04-23 | End: 2024-04-23

## 2024-12-05 ENCOUNTER — LAB REQUISITION (OUTPATIENT)
Dept: LAB | Facility: HOSPITAL | Age: 84
End: 2024-12-05
Payer: MEDICARE

## 2024-12-05 DIAGNOSIS — I48.91 UNSPECIFIED ATRIAL FIBRILLATION: ICD-10-CM

## 2024-12-05 LAB
25(OH)D3 SERPL-MCNC: 18.3 NG/ML (ref 30–100)
ALBUMIN SERPL-MCNC: 3.2 G/DL (ref 3.5–5.2)
ALBUMIN/GLOB SERPL: 0.8 G/DL
ALP SERPL-CCNC: 88 U/L (ref 39–117)
ALT SERPL W P-5'-P-CCNC: 14 U/L (ref 1–33)
ANION GAP SERPL CALCULATED.3IONS-SCNC: 10.4 MMOL/L (ref 5–15)
AST SERPL-CCNC: 23 U/L (ref 1–32)
BASOPHILS # BLD AUTO: 0.03 10*3/MM3 (ref 0–0.2)
BASOPHILS NFR BLD AUTO: 0.6 % (ref 0–1.5)
BILIRUB SERPL-MCNC: 0.6 MG/DL (ref 0–1.2)
BUN SERPL-MCNC: 18 MG/DL (ref 8–23)
BUN/CREAT SERPL: 26.5 (ref 7–25)
CALCIUM SPEC-SCNC: 8.6 MG/DL (ref 8.6–10.5)
CHLORIDE SERPL-SCNC: 103 MMOL/L (ref 98–107)
CHOLEST SERPL-MCNC: 147 MG/DL (ref 0–200)
CO2 SERPL-SCNC: 27.6 MMOL/L (ref 22–29)
CREAT SERPL-MCNC: 0.68 MG/DL (ref 0.57–1)
DEPRECATED RDW RBC AUTO: 54.7 FL (ref 37–54)
EGFRCR SERPLBLD CKD-EPI 2021: 86 ML/MIN/1.73
EOSINOPHIL # BLD AUTO: 0.22 10*3/MM3 (ref 0–0.4)
EOSINOPHIL NFR BLD AUTO: 4.5 % (ref 0.3–6.2)
ERYTHROCYTE [DISTWIDTH] IN BLOOD BY AUTOMATED COUNT: 13.7 % (ref 12.3–15.4)
GLOBULIN UR ELPH-MCNC: 4.1 GM/DL
GLUCOSE SERPL-MCNC: 99 MG/DL (ref 65–99)
HCT VFR BLD AUTO: 29.8 % (ref 34–46.6)
HDLC SERPL-MCNC: 27 MG/DL (ref 40–60)
HGB BLD-MCNC: 9.4 G/DL (ref 12–15.9)
IMM GRANULOCYTES # BLD AUTO: 0.01 10*3/MM3 (ref 0–0.05)
IMM GRANULOCYTES NFR BLD AUTO: 0.2 % (ref 0–0.5)
LDLC SERPL CALC-MCNC: 97 MG/DL (ref 0–100)
LDLC/HDLC SERPL: 3.49 {RATIO}
LYMPHOCYTES # BLD AUTO: 1.83 10*3/MM3 (ref 0.7–3.1)
LYMPHOCYTES NFR BLD AUTO: 37.3 % (ref 19.6–45.3)
MCH RBC QN AUTO: 34.3 PG (ref 26.6–33)
MCHC RBC AUTO-ENTMCNC: 31.5 G/DL (ref 31.5–35.7)
MCV RBC AUTO: 108.8 FL (ref 79–97)
MONOCYTES # BLD AUTO: 0.69 10*3/MM3 (ref 0.1–0.9)
MONOCYTES NFR BLD AUTO: 14.1 % (ref 5–12)
NEUTROPHILS NFR BLD AUTO: 2.13 10*3/MM3 (ref 1.7–7)
NEUTROPHILS NFR BLD AUTO: 43.3 % (ref 42.7–76)
NRBC BLD AUTO-RTO: 0 /100 WBC (ref 0–0.2)
PLATELET # BLD AUTO: 243 10*3/MM3 (ref 140–450)
PMV BLD AUTO: 11.3 FL (ref 6–12)
POTASSIUM SERPL-SCNC: 3.9 MMOL/L (ref 3.5–5.2)
PROT SERPL-MCNC: 7.3 G/DL (ref 6–8.5)
RBC # BLD AUTO: 2.74 10*6/MM3 (ref 3.77–5.28)
SODIUM SERPL-SCNC: 141 MMOL/L (ref 136–145)
TRIGL SERPL-MCNC: 129 MG/DL (ref 0–150)
TSH SERPL DL<=0.05 MIU/L-ACNC: 2.97 UIU/ML (ref 0.27–4.2)
VIT B12 BLD-MCNC: 973 PG/ML (ref 211–946)
VLDLC SERPL-MCNC: 23 MG/DL (ref 5–40)
WBC NRBC COR # BLD AUTO: 4.91 10*3/MM3 (ref 3.4–10.8)

## 2024-12-05 PROCEDURE — 82306 VITAMIN D 25 HYDROXY: CPT | Performed by: INTERNAL MEDICINE

## 2024-12-05 PROCEDURE — 84443 ASSAY THYROID STIM HORMONE: CPT | Performed by: INTERNAL MEDICINE

## 2024-12-05 PROCEDURE — 82607 VITAMIN B-12: CPT | Performed by: INTERNAL MEDICINE

## 2024-12-05 PROCEDURE — 85025 COMPLETE CBC W/AUTO DIFF WBC: CPT | Performed by: INTERNAL MEDICINE

## 2024-12-05 PROCEDURE — 80061 LIPID PANEL: CPT | Performed by: INTERNAL MEDICINE

## 2024-12-05 PROCEDURE — 80053 COMPREHEN METABOLIC PANEL: CPT | Performed by: INTERNAL MEDICINE

## 2024-12-10 ENCOUNTER — LAB REQUISITION (OUTPATIENT)
Dept: LAB | Facility: HOSPITAL | Age: 84
End: 2024-12-10
Payer: MEDICARE

## 2024-12-10 DIAGNOSIS — E03.9 HYPOTHYROIDISM, UNSPECIFIED: ICD-10-CM

## 2024-12-10 DIAGNOSIS — I10 ESSENTIAL (PRIMARY) HYPERTENSION: ICD-10-CM

## 2024-12-10 DIAGNOSIS — E61.2 MAGNESIUM DEFICIENCY: ICD-10-CM

## 2024-12-10 LAB
ANION GAP SERPL CALCULATED.3IONS-SCNC: 10.2 MMOL/L (ref 5–15)
BASOPHILS # BLD AUTO: 0.03 10*3/MM3 (ref 0–0.2)
BASOPHILS NFR BLD AUTO: 0.4 % (ref 0–1.5)
BUN SERPL-MCNC: 15 MG/DL (ref 8–23)
BUN/CREAT SERPL: 18.3 (ref 7–25)
CALCIUM SPEC-SCNC: 8.9 MG/DL (ref 8.6–10.5)
CHLORIDE SERPL-SCNC: 106 MMOL/L (ref 98–107)
CO2 SERPL-SCNC: 23.8 MMOL/L (ref 22–29)
CREAT SERPL-MCNC: 0.82 MG/DL (ref 0.57–1)
DEPRECATED RDW RBC AUTO: 55.5 FL (ref 37–54)
EGFRCR SERPLBLD CKD-EPI 2021: 70.6 ML/MIN/1.73
EOSINOPHIL # BLD AUTO: 0.2 10*3/MM3 (ref 0–0.4)
EOSINOPHIL NFR BLD AUTO: 2.8 % (ref 0.3–6.2)
ERYTHROCYTE [DISTWIDTH] IN BLOOD BY AUTOMATED COUNT: 14 % (ref 12.3–15.4)
GLUCOSE SERPL-MCNC: 98 MG/DL (ref 65–99)
HCT VFR BLD AUTO: 29.6 % (ref 34–46.6)
HGB BLD-MCNC: 9.5 G/DL (ref 12–15.9)
IMM GRANULOCYTES # BLD AUTO: 0.03 10*3/MM3 (ref 0–0.05)
IMM GRANULOCYTES NFR BLD AUTO: 0.4 % (ref 0–0.5)
LYMPHOCYTES # BLD AUTO: 2.06 10*3/MM3 (ref 0.7–3.1)
LYMPHOCYTES NFR BLD AUTO: 28.8 % (ref 19.6–45.3)
MAGNESIUM SERPL-MCNC: 2.2 MG/DL (ref 1.6–2.4)
MCH RBC QN AUTO: 34.7 PG (ref 26.6–33)
MCHC RBC AUTO-ENTMCNC: 32.1 G/DL (ref 31.5–35.7)
MCV RBC AUTO: 108 FL (ref 79–97)
MONOCYTES # BLD AUTO: 0.65 10*3/MM3 (ref 0.1–0.9)
MONOCYTES NFR BLD AUTO: 9.1 % (ref 5–12)
NEUTROPHILS NFR BLD AUTO: 4.18 10*3/MM3 (ref 1.7–7)
NEUTROPHILS NFR BLD AUTO: 58.5 % (ref 42.7–76)
NRBC BLD AUTO-RTO: 0 /100 WBC (ref 0–0.2)
PLATELET # BLD AUTO: 325 10*3/MM3 (ref 140–450)
PMV BLD AUTO: 11 FL (ref 6–12)
POTASSIUM SERPL-SCNC: 3.7 MMOL/L (ref 3.5–5.2)
RBC # BLD AUTO: 2.74 10*6/MM3 (ref 3.77–5.28)
SODIUM SERPL-SCNC: 140 MMOL/L (ref 136–145)
WBC NRBC COR # BLD AUTO: 7.15 10*3/MM3 (ref 3.4–10.8)

## 2024-12-10 PROCEDURE — 85025 COMPLETE CBC W/AUTO DIFF WBC: CPT | Performed by: INTERNAL MEDICINE

## 2024-12-10 PROCEDURE — 83735 ASSAY OF MAGNESIUM: CPT | Performed by: INTERNAL MEDICINE

## 2024-12-10 PROCEDURE — 80048 BASIC METABOLIC PNL TOTAL CA: CPT | Performed by: INTERNAL MEDICINE
